# Patient Record
Sex: FEMALE | Race: WHITE | NOT HISPANIC OR LATINO | Employment: FULL TIME | URBAN - METROPOLITAN AREA
[De-identification: names, ages, dates, MRNs, and addresses within clinical notes are randomized per-mention and may not be internally consistent; named-entity substitution may affect disease eponyms.]

---

## 2017-03-06 ENCOUNTER — HOSPITAL ENCOUNTER (EMERGENCY)
Facility: CLINIC | Age: 27
Discharge: HOME | End: 2017-03-06
Attending: NURSE PRACTITIONER | Admitting: NURSE PRACTITIONER
Payer: COMMERCIAL

## 2017-03-06 VITALS
OXYGEN SATURATION: 98 % | DIASTOLIC BLOOD PRESSURE: 91 MMHG | HEART RATE: 113 BPM | TEMPERATURE: 98.2 F | WEIGHT: 210 LBS | BODY MASS INDEX: 38.41 KG/M2 | RESPIRATION RATE: 20 BRPM | SYSTOLIC BLOOD PRESSURE: 137 MMHG

## 2017-03-06 DIAGNOSIS — J06.9 VIRAL URI: ICD-10-CM

## 2017-03-06 LAB
INTERNAL QC OK POCT: YES
S PYO AG THROAT QL IA.RAPID: NEGATIVE

## 2017-03-06 PROCEDURE — 87880 STREP A ASSAY W/OPTIC: CPT | Performed by: NURSE PRACTITIONER

## 2017-03-06 PROCEDURE — 99213 OFFICE O/P EST LOW 20 MIN: CPT

## 2017-03-06 PROCEDURE — 99213 OFFICE O/P EST LOW 20 MIN: CPT | Performed by: NURSE PRACTITIONER

## 2017-03-06 PROCEDURE — 87081 CULTURE SCREEN ONLY: CPT | Performed by: NURSE PRACTITIONER

## 2017-03-06 ASSESSMENT — ENCOUNTER SYMPTOMS
LIGHT-HEADEDNESS: 1
RESPIRATORY NEGATIVE: 1
RHINORRHEA: 0
EYES NEGATIVE: 1
GASTROINTESTINAL NEGATIVE: 1
CARDIOVASCULAR NEGATIVE: 1
SORE THROAT: 1
FEVER: 1
MUSCULOSKELETAL NEGATIVE: 1
DIZZINESS: 0

## 2017-03-06 NOTE — ED AVS SNAPSHOT
[facility] Emergency Department    [address] [address]    Phone:  [phone]    Fax:  [phone]                                       Rosy España   MRN: [id number]    Department:  [facility] Emergency Department   Date of Visit:  3/6/2017           Patient Information     Date Of Birth          1990        Your diagnoses for this visit were:     Viral URI        You were seen by Gelacio Portillo, VICKY ROACH.      Follow-up Information     Follow up with None.    Why:  As needed        Discharge Instructions             Discharge References/Attachments     URI, VIRAL, NO ABX (ADULT) (ENGLISH)      24 Hour Appointment Hotline       To make an appointment at any [facility] clinic, call [phone] ([phone]). If you don't have a family doctor or clinic, we will help you find one. [facility] clinics are conveniently located to serve the needs of you and your family.             Review of your medicines      Our records show that you are taking the medicines listed below. If these are incorrect, please call your family doctor or clinic.        Dose / Directions Last dose taken    benzonatate 200 MG capsule   Commonly known as:  TESSALON   Dose:  200 mg   Quantity:  21 capsule        Take 1 capsule (200 mg) by mouth 3 times daily as needed for cough   Refills:  0        CITALOPRAM HYDROBROMIDE PO        Refills:  0        guaiFENesin-codeine 100-10 MG/5ML Soln solution   Commonly known as:  ROBITUSSIN AC   Dose:  1 tsp.   Quantity:  120 mL        Take 5 mLs by mouth every 4 hours as needed for cough   Refills:  0        prenatal multivitamin  plus iron 27-0.8 MG Tabs per tablet   Dose:  1 tablet        Take 1 tablet by mouth daily   Refills:  0        VITAMIN D (CHOLECALCIFEROL) PO   Dose:  5000 Units        Take 5,000 Units by mouth daily   Refills:  0                Procedures and tests performed during your visit     Rapid strep group A screen POCT      Orders  Needing Specimen Collection     None      Pending Results     No orders found from 3/4/2017 to 3/7/2017.            Pending Culture Results     No orders found from 3/4/2017 to 3/7/2017.             Test Results from your hospital stay     3/6/2017  6:39 PM - Carol Law LPN      Component Results     Component Value Ref Range & Units Status    Rapid Strep A Screen negative neg Final    Internal QC OK Yes  Final                Thank you for choosing [facility]       Thank you for choosing [facility] for your care. Our goal is always to provide you with excellent care. Hearing back from our patients is one way we can continue to improve our services. Please take a few minutes to complete the written survey that you may receive in the mail after you visit with us. Thank you!        [employer] Information     [employer] gives you secure access to your electronic health record. If you see a primary care provider, you can also send messages to your care team and make appointments. If you have questions, please call your primary care clinic.  If you do not have a primary care provider, please call [phone] and they will assist you.        Care EveryWhere ID     This is your Care EveryWhere ID. This could be used by other organizations to access your [facility] medical records  [id number]        After Visit Summary       This is your record. Keep this with you and show to your community pharmacist(s) and doctor(s) at your next visit.

## 2017-03-06 NOTE — ED AVS SNAPSHOT
[facility] Emergency Department    [address] [address]    Phone:  [phone]    Fax:  [phone]                                       Rosy España   MRN: [id number]    Department:  [facility] Emergency Department   Date of Visit:  3/6/2017           After Visit Summary Signature Page     I have received my discharge instructions, and my questions have been answered. I have discussed any challenges I see with this plan with the nurse or doctor.    ..........................................................................................................................................  Patient/Patient Representative Signature      ..........................................................................................................................................  Patient Representative Print Name and Relationship to Patient    ..................................................               ................................................  Date                                            Time    ..........................................................................................................................................  Reviewed by Signature/Title    ...................................................              ..............................................  Date                                                            Time

## 2017-03-07 ENCOUNTER — TELEPHONE (OUTPATIENT)
Dept: PEDIATRICS | Facility: CLINIC | Age: 27
End: 2017-03-07

## 2017-03-07 NOTE — TELEPHONE ENCOUNTER
Prior Authorization required on AZELEX  Insurance Phone [phone]    Patient ID  [id number]  Please contact the pharmacy with Prior Auth status (approved/denied)  Thank you, Addis Hussein, Technician  [facility]  [phone]

## 2017-03-07 NOTE — ED PROVIDER NOTES
History     Chief Complaint   Patient presents with     Otalgia     Fever     101.5 before ibuprofen     HPI  Rosy España is a 26 year old female who presents with fever, ST and a swollen gland on the left side. She has felt run down for about 3 weeks. She has been having pain on the left side near her ear but below. No significant swelling however. She did have a fever today as well. 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     I have reviewed the Medications, Allergies, Past Medical and Surgical History, and Social History in the Epic system.    Review of Systems   Constitutional: Positive for fever.   HENT: Positive for congestion, ear pain and sore throat. Negative for postnasal drip and rhinorrhea.    Eyes: Negative.    Respiratory: Negative.    Cardiovascular: Negative.    Gastrointestinal: Negative.    Genitourinary: Negative.    Musculoskeletal: Negative.    Skin: Negative.    Neurological: Positive for light-headedness. Negative for dizziness and syncope.       Physical Exam   BP: (!) 137/91  Pulse: 113  Temp: 98.2  F (36.8  C)  Resp: 20  Weight: 95.3 kg (210 lb)  SpO2: 98 %  Physical Exam   Constitutional: She is oriented to person, place, and time. She appears well-developed and well-nourished. No distress.   HENT:   Right Ear: External ear normal.   Left Ear: External ear normal.   Nose: Nose normal.   Mouth/Throat: No oropharyngeal exudate.   Eyes: Conjunctivae and EOM are normal. Right eye exhibits no discharge. Left eye exhibits no discharge. No scleral icterus.   Neck: Neck supple. No thyromegaly present.   Cardiovascular: Normal rate and normal heart sounds.    No murmur heard.  Pulmonary/Chest: Effort normal and breath sounds normal. No respiratory distress. She has no wheezes. She has no rales.   Musculoskeletal: Normal range of motion.   Lymphadenopathy:     She has cervical adenopathy.   Neurological: She is alert and oriented to person, place, and time. No cranial nerve  deficit. She exhibits normal muscle tone. Coordination normal.   Skin: Skin is warm and dry. No rash noted.       ED Course     ED Course     Procedures             Labs Ordered and Resulted from Time of ED Arrival Up to the Time of Departure from the ED   RAPID STREP GROUP A SCREEN POCT   rapid strep negative    Assessments & Plan (with Medical Decision Making)   Viral syndrome, possibly mono or CMV but declines testing at this time. Suggested that she should f/u with PCP if she continues to have vague symptoms.    I have reviewed the nursing notes.    I have reviewed the findings, diagnosis, plan and need for follow up with the patient.    Current Discharge Medication List          Final diagnoses:   Viral URI       3/6/2017   [facility] EMERGENCY DEPARTMENT     Gelacio Portillo, VICKY CNP  03/06/17 4263

## 2017-03-08 LAB
BACTERIA SPEC CULT: NORMAL
MICRO REPORT STATUS: NORMAL
SPECIMEN SOURCE: NORMAL

## 2017-03-08 NOTE — TELEPHONE ENCOUNTER
I do not think this is a patient of ours here. Has not seen a PCP at [facility] since 2014. Thank you!    Addis Tipton RN

## 2017-03-09 NOTE — TELEPHONE ENCOUNTER
Look like Azelex was ordered and then discontinued. Huddled with Willian Portillo. Reports that med was ordered in error on this patient. Pharmacy advised.     [facility] RN

## 2017-03-14 ENCOUNTER — HOSPITAL ENCOUNTER (EMERGENCY)
Facility: CLINIC | Age: 27
Discharge: HOME | End: 2017-03-14
Attending: PHYSICIAN ASSISTANT | Admitting: PHYSICIAN ASSISTANT
Payer: COMMERCIAL

## 2017-03-14 VITALS
DIASTOLIC BLOOD PRESSURE: 66 MMHG | RESPIRATION RATE: 18 BRPM | OXYGEN SATURATION: 98 % | TEMPERATURE: 99.4 F | SYSTOLIC BLOOD PRESSURE: 122 MMHG

## 2017-03-14 DIAGNOSIS — R11.2 NAUSEA VOMITING AND DIARRHEA: ICD-10-CM

## 2017-03-14 DIAGNOSIS — R19.7 NAUSEA VOMITING AND DIARRHEA: ICD-10-CM

## 2017-03-14 PROCEDURE — 99283 EMERGENCY DEPT VISIT LOW MDM: CPT | Performed by: PHYSICIAN ASSISTANT

## 2017-03-14 PROCEDURE — 99282 EMERGENCY DEPT VISIT SF MDM: CPT

## 2017-03-14 RX ORDER — AMOXICILLIN 500 MG/1
500 CAPSULE ORAL 2 TIMES DAILY
COMMUNITY
Start: 2017-03-08 | End: 2017-03-18

## 2017-03-14 RX ORDER — ONDANSETRON 4 MG/1
4 TABLET, ORALLY DISINTEGRATING ORAL EVERY 8 HOURS PRN
Qty: 15 TABLET | Refills: 0 | Status: SHIPPED | OUTPATIENT
Start: 2017-03-14 | End: 2017-03-17

## 2017-03-14 NOTE — ED AVS SNAPSHOT
[facility] Emergency Department    [address] [address]    Phone:  [phone]    Fax:  [phone]                                       Rosy España   MRN: [id number]    Department:  [facility] Emergency Department   Date of Visit:  3/14/2017           Patient Information     Date Of Birth          1990        Your diagnoses for this visit were:     Nausea vomiting and diarrhea        You were seen by Anibal Azar PA-C.      Follow-up Information     Follow up with No Ref-Primary, Physician In 2 days.    Why:  As needed        Discharge Instructions       Consider loperamide over the counter - see instructions.  Hydrate every hour if possible about an hour after taking the zofran.  Miguel Azar PA-C    24 Hour Appointment Hotline       To make an appointment at any [facility] clinic, call [phone]). If you don't have a family doctor or clinic, we will help you find one. [facility] clinics are conveniently located to serve the needs of you and your family.             Review of your medicines      START taking        Dose / Directions Last dose taken    ondansetron 4 MG ODT tab   Commonly known as:  ZOFRAN ODT   Dose:  4 mg   Quantity:  15 tablet        Take 1 tablet (4 mg) by mouth every 8 hours as needed for nausea   Refills:  0          Our records show that you are taking the medicines listed below. If these are incorrect, please call your family doctor or clinic.        Dose / Directions Last dose taken    benzonatate 200 MG capsule   Commonly known as:  TESSALON   Dose:  200 mg   Quantity:  21 capsule        Take 1 capsule (200 mg) by mouth 3 times daily as needed for cough   Refills:  0        CITALOPRAM HYDROBROMIDE PO        Refills:  0        guaiFENesin-codeine 100-10 MG/5ML Soln solution   Commonly known as:  ROBITUSSIN AC   Dose:  1 tsp.   Quantity:  120 mL        Take 5 mLs by mouth every 4 hours as needed for cough   Refills:  0         prenatal multivitamin  plus iron 27-0.8 MG Tabs per tablet   Dose:  1 tablet        Take 1 tablet by mouth daily   Refills:  0        VITAMIN D (CHOLECALCIFEROL) PO   Dose:  5000 Units        Take 5,000 Units by mouth daily   Refills:  0                Prescriptions were sent or printed at these locations (1 Prescription)                   Other Prescriptions                Printed at Department/Unit printer (1 of 1)         ondansetron (ZOFRAN ODT) 4 MG ODT tab                Orders Needing Specimen Collection     None      Pending Results     No orders found from 3/12/2017 to 3/15/2017.            Pending Culture Results     No orders found from 3/12/2017 to 3/15/2017.             Test Results from your hospital stay            Thank you for choosing [facility]       Thank you for choosing [facility] for your care. Our goal is always to provide you with excellent care. Hearing back from our patients is one way we can continue to improve our services. Please take a few minutes to complete the written survey that you may receive in the mail after you visit with us. Thank you!        [employer] Information     [employer] gives you secure access to your electronic health record. If you see a primary care provider, you can also send messages to your care team and make appointments. If you have questions, please call your primary care clinic.  If you do not have a primary care provider, please call [phone] and they will assist you.        Care EveryWhere ID     This is your Care EveryWhere ID. This could be used by other organizations to access your [facility] medical records  [id number]        After Visit Summary       This is your record. Keep this with you and show to your community pharmacist(s) and doctor(s) at your next visit.

## 2017-03-14 NOTE — ED AVS SNAPSHOT
[facility] Emergency Department    [address] [address]    Phone:  [phone]    Fax:  [phone]                                       Rosy España   MRN: [id number]    Department:  [facility] Emergency Department   Date of Visit:  3/14/2017           After Visit Summary Signature Page     I have received my discharge instructions, and my questions have been answered. I have discussed any challenges I see with this plan with the nurse or doctor.    ..........................................................................................................................................  Patient/Patient Representative Signature      ..........................................................................................................................................  Patient Representative Print Name and Relationship to Patient    ..................................................               ................................................  Date                                            Time    ..........................................................................................................................................  Reviewed by Signature/Title    ...................................................              ..............................................  Date                                                            Time

## 2017-03-14 NOTE — DISCHARGE INSTRUCTIONS
Consider loperamide over the counter - see instructions.  Hydrate every hour if possible about an hour after taking the zofran.  Miguel Azar PA-C

## 2017-03-14 NOTE — ED PROVIDER NOTES
History     Chief Complaint   Patient presents with     Nausea, Vomiting, & Diarrhea     Pt woke up with N, V, D at 0130.  Fever started during th night too.  Headache for the past 2 hours.      Fever     Headache     HPI  Rosy España is a 26 year old female who has N V D.  Onset one day.  Nothing bloody, no travel, or obvious exposure.  Possible enchilada last night was the source.  Abd cramping is present.     I have reviewed the Medications, Allergies, Past Medical and Surgical History, and Social History in the Epic system.    Review of Systems  ROS:  General:  No night sweats reported  ENT: No epistaxis  Skin: No petechiae  Psychiatric: Not combative  : No hematuria reported    Physical Exam   BP: 122/66  Heart Rate: 122  Temp: 99.4  F (37.4  C)  Resp: 20  SpO2: 98 %  Physical Exam  Gen: 26 year old female appears stated age  Eyes: Equal and round  Head: NC, AT, GCS 15  ENT: Canal and nares patent  Extremity: MAEW  Skin: Warm and dry  Psych: Mood and affect normal  Neuro: CN II-XII grossly in tact  Abd: soft NTND BS+  ED Course     ED Course     Procedures        Long discussion / education on NVD and hydration       Labs Ordered and Resulted from Time of ED Arrival Up to the Time of Departure from the ED - No data to display    Assessments & Plan (with Medical Decision Making)     I have reviewed the nursing notes.    I have reviewed the findings, diagnosis, plan and need for follow up with the patient.    New Prescriptions    ONDANSETRON (ZOFRAN ODT) 4 MG ODT TAB    Take 1 tablet (4 mg) by mouth every 8 hours as needed for nausea       Final diagnoses:   Nausea vomiting and diarrhea       3/14/2017   [facility] EMERGENCY DEPARTMENT     Anibal Azar PA-C  03/14/17 6580

## 2017-11-15 ENCOUNTER — HOSPITAL ENCOUNTER (EMERGENCY)
Facility: CLINIC | Age: 27
Discharge: HOME | End: 2017-11-15
Attending: FAMILY MEDICINE | Admitting: FAMILY MEDICINE
Payer: COMMERCIAL

## 2017-11-15 VITALS
RESPIRATION RATE: 16 BRPM | TEMPERATURE: 98.7 F | SYSTOLIC BLOOD PRESSURE: 137 MMHG | OXYGEN SATURATION: 100 % | DIASTOLIC BLOOD PRESSURE: 78 MMHG

## 2017-11-15 DIAGNOSIS — J02.0 STREPTOCOCCAL PHARYNGITIS: ICD-10-CM

## 2017-11-15 DIAGNOSIS — J06.9 URI WITH COUGH AND CONGESTION: ICD-10-CM

## 2017-11-15 LAB
INTERNAL QC OK POCT: YES
S PYO AG THROAT QL IA.RAPID: ABNORMAL

## 2017-11-15 PROCEDURE — 99213 OFFICE O/P EST LOW 20 MIN: CPT | Performed by: FAMILY MEDICINE

## 2017-11-15 PROCEDURE — 87880 STREP A ASSAY W/OPTIC: CPT | Performed by: FAMILY MEDICINE

## 2017-11-15 PROCEDURE — 99213 OFFICE O/P EST LOW 20 MIN: CPT

## 2017-11-15 RX ORDER — PENICILLIN V POTASSIUM 500 MG/1
500 TABLET, FILM COATED ORAL 2 TIMES DAILY
Qty: 20 TABLET | Refills: 0 | Status: SHIPPED | OUTPATIENT
Start: 2017-11-15 | End: 2017-11-25

## 2017-11-15 RX ORDER — FLUTICASONE PROPIONATE 50 MCG
1-2 SPRAY, SUSPENSION (ML) NASAL DAILY
Qty: 1 BOTTLE | Refills: 0 | Status: SHIPPED | OUTPATIENT
Start: 2017-11-15 | End: 2021-12-13

## 2017-11-15 NOTE — ED AVS SNAPSHOT
[facility] Emergency Department    [address] [address]    Phone:  [phone]    Fax:  [phone]                                       Rosy España   MRN: [id number]    Department:  [facility] Emergency Department   Date of Visit:  11/15/2017           After Visit Summary Signature Page     I have received my discharge instructions, and my questions have been answered. I have discussed any challenges I see with this plan with the nurse or doctor.    ..........................................................................................................................................  Patient/Patient Representative Signature      ..........................................................................................................................................  Patient Representative Print Name and Relationship to Patient    ..................................................               ................................................  Date                                            Time    ..........................................................................................................................................  Reviewed by Signature/Title    ...................................................              ..............................................  Date                                                            Time

## 2017-11-15 NOTE — DISCHARGE INSTRUCTIONS
(J02.0) Streptococcal pharyngitis  (J06.9) URI with cough and congestion    -- your rapid strep test was positive  -- recommend starting penicillin 500 mg by mouth twice daily x 10 days  -- in the meantime recommend symptomatic measures including increased fluids, rest, appropriate use of tylenol and ibuprofen, throat drops/lozenges, increased fluids, and licorice/mint teas.    -- follow-up if not at least notably improved in about 1 week, sooner if notably worsening or if having increased trouble with swallowing  -- please avoid contact with others as much as possible until you've been on antibiotics for at least 24 hrs and not having any fevers.  --  the Flonase and use for congestion.    Follow up as needed.

## 2017-11-15 NOTE — ED AVS SNAPSHOT
[facility] Emergency Department    [address] [address]    Phone:  [phone]    Fax:  [phone]                                       Rosy España   MRN: [id number]    Department:  [facility] Emergency Department   Date of Visit:  11/15/2017           Patient Information     Date Of Birth          1990        Your diagnoses for this visit were:     URI with cough and congestion     Streptococcal pharyngitis        You were seen by Tisha David MD.      Follow-up Information     Follow up with No Ref-Primary, Physician.    Why:  As needed    Contact information:    NO REF-PRIMARY PHYSICIAN          Discharge Instructions       (J02.0) Streptococcal pharyngitis  (J06.9) URI with cough and congestion    -- your rapid strep test was positive  -- recommend starting penicillin 500 mg by mouth twice daily x 10 days  -- in the meantime recommend symptomatic measures including increased fluids, rest, appropriate use of tylenol and ibuprofen, throat drops/lozenges, increased fluids, and licorice/mint teas.    -- follow-up if not at least notably improved in about 1 week, sooner if notably worsening or if having increased trouble with swallowing  -- please avoid contact with others as much as possible until you've been on antibiotics for at least 24 hrs and not having any fevers.  --  the Flonase and use for congestion.    Follow up as needed.          24 Hour Appointment Hotline       To make an appointment at any [facility] clinic, call [phone]). If you don't have a family doctor or clinic, we will help you find one. [facility] clinics are conveniently located to serve the needs of you and your family.             Review of your medicines      START taking        Dose / Directions Last dose taken    fluticasone 50 MCG/ACT spray   Commonly known as:  FLONASE   Dose:  1-2 spray   Quantity:  1 Bottle        Spray 1-2 sprays into both nostrils  daily   Refills:  0        penicillin V potassium 500 MG tablet   Commonly known as:  VEETID   Dose:  500 mg   Quantity:  20 tablet        Take 1 tablet (500 mg) by mouth 2 times daily for 10 days   Refills:  0          Our records show that you are taking the medicines listed below. If these are incorrect, please call your family doctor or clinic.        Dose / Directions Last dose taken    CITALOPRAM HYDROBROMIDE PO   Dose:  40 mg        Take 40 mg by mouth daily   Refills:  0                Prescriptions were sent or printed at these locations (2 Prescriptions)                   [facility] [place], [place] - [address]   [address] [address]    Telephone:  [phone]   Fax:  [phone]   Hours:                  E-Prescribed (2 of 2)         penicillin V potassium (VEETID) 500 MG tablet               fluticasone (FLONASE) 50 MCG/ACT spray                Orders Needing Specimen Collection     None      Pending Results     No orders found from 11/13/2017 to 11/16/2017.            Pending Culture Results     No orders found from 11/13/2017 to 11/16/2017.            Pending Results Instructions     If you had any lab results that were not finalized at the time of your Discharge, you can call the ED Lab Result RN at [phone]. You will be contacted by this team for any positive Lab results or changes in treatment. The nurses are available 7 days a week from 10A to 6:30P.  You can leave a message 24 hours per day and they will return your call.        Test Results From Your Hospital Stay               Thank you for choosing [facility]       Thank you for choosing [facility] for your care. Our goal is always to provide you with excellent care. Hearing back from our patients is one way we can continue to improve our services. Please take a few minutes to complete the written survey that you may receive in the mail after you visit with us. Thank you!        MyChart Information      [employer] gives you secure access to your electronic health record. If you see a primary care provider, you can also send messages to your care team and make appointments. If you have questions, please call your primary care clinic.  If you do not have a primary care provider, please call [phone] and they will assist you.        Care EveryWhere ID     This is your Care EveryWhere ID. This could be used by other organizations to access your [facility] medical records  [id number]        Equal Access to Services     HAWA BREAUX : Hadii yashira guamano Soyoel, waaxda luqadaha, qaybta kaalmada adecelia, shanna mcgee . So [facility] [phone].    ATENCIÓN: Si habla español, tiene a sun disposición servicios gratuitos de asistencia lingüística. Milena al [phone].    We comply with applicable federal civil rights laws and [place] laws. We do not discriminate on the basis of race, color, national origin, age, disability, sex, sexual orientation, or gender identity.            After Visit Summary       This is your record. Keep this with you and show to your community pharmacist(s) and doctor(s) at your next visit.

## 2017-11-15 NOTE — ED PROVIDER NOTES
History     Chief Complaint   Patient presents with     Pharyngitis     Sore throat off and on, achy all over, headache, nausea, feels similar to strep in past.     HPI  Rosy España is a 27 year old female who presents with  Concerns about strep.  5 days ago- left ear pain, this has however since resolved. She has been achy and tired and has an associated sore throat which she describes as mild but is concerned about all the symptoms coming together. No congestion, no post nasal drainage that she has been able to feel, no throat clearing. No associated fever but she has had some sweats especially at night.  No known ill contacts. No history of seasonal allergies.  She has been taking tylenol/ibuprofen for pain but this has not been of help.        Problem List:    Patient Active Problem List    Diagnosis Date Noted     Obsessive thinking 03/30/2013     Priority: Medium     CARDIOVASCULAR SCREENING; LDL GOAL LESS THAN 160 08/16/2011     Priority: Medium     Anxiety 01/06/2011     Priority: Medium        Past Medical History:    No past medical history on file.    Past Surgical History:    Past Surgical History:   Procedure Laterality Date     ARTHROSCOPY KNEE RT/LT  7/07    partial medial meniscectomy, RT       Family History:    Family History   Problem Relation Age of Onset     HEART DISEASE Father      MI     C.A.D. Father      DIABETES Maternal Grandfather      Breast Cancer No family hx of      Cancer - colorectal No family hx of        Social History:  Marital Status:   [2]  Social History   Substance Use Topics     Smoking status: Never Smoker     Smokeless tobacco: Never Used     Alcohol use Yes      Comment: once per month        Medications:      CITALOPRAM HYDROBROMIDE PO         Review of Systems   Pertinent aspects as in the history.       Physical Exam   BP: 137/78  Heart Rate: 93  Temp: 98.7  F (37.1  C)  Resp: 16  SpO2: 100 %    Physical Exam   General - Awake and alert, not in any obvious  distress. Afebrile, not pale well hydrated.  Head - Normocephalic, atraumatic.  Ears - Tympanic membranes clear bilaterally. External canals without lesion.  Mouth - Mild posterior pharyngeal erythema, tonsils not enlarged or exudative.   Neck - no cervical adenopathy, thyromegally, JVD or carotid bruits noted.  Psych - Judgment and mental status are clear, patient has reasonable insight. Mood is stable.        ED Course     ED Course     Procedures    Strep test positive      Labs Ordered and Resulted from Time of ED Arrival Up to the Time of Departure from the ED - No data to display    Assessments & Plan (with Medical Decision Making)     I have reviewed the nursing notes.    I have reviewed the findings, diagnosis, plan and need for follow up with the patient.    New Prescriptions    FLUTICASONE (FLONASE) 50 MCG/ACT SPRAY    Spray 1-2 sprays into both nostrils daily    PENICILLIN V POTASSIUM (VEETID) 500 MG TABLET    Take 1 tablet (500 mg) by mouth 2 times daily for 10 days       Final diagnoses:   URI with cough and congestion   Streptococcal pharyngitis       Diagnosis, differential diagnosis, treatment and prognosis discussed with patient    See below for summary of discussion(s) with patient:    -- your rapid strep test was positive  -- recommend starting penicillin 500 mg by mouth twice daily x 10 days  -- in the meantime recommend symptomatic measures including increased fluids, rest, appropriate use of tylenol and ibuprofen, throat drops/lozenges, increased fluids, and licorice/mint teas.    -- follow-up if not at least notably improved in about 1 week, sooner if notably worsening or if having increased trouble with swallowing  -- please avoid contact with others as much as possible until you've been on antibiotics for at least 24 hrs and not having any fevers.  --  the Flonase and use for congestion.    Follow up as needed.  11/15/2017   [facility] EMERGENCY DEPARTMENT     Tisha David  MD [place]  11/15/17 [id number]

## 2018-02-04 ENCOUNTER — HEALTH MAINTENANCE LETTER (OUTPATIENT)
Age: 28
End: 2018-02-04

## 2018-12-31 ENCOUNTER — HOSPITAL ENCOUNTER (EMERGENCY)
Facility: CLINIC | Age: 28
Discharge: HOME | End: 2018-12-31
Attending: PHYSICIAN ASSISTANT | Admitting: PHYSICIAN ASSISTANT
Payer: COMMERCIAL

## 2018-12-31 VITALS — OXYGEN SATURATION: 100 % | RESPIRATION RATE: 18 BRPM | TEMPERATURE: 99.3 F

## 2018-12-31 DIAGNOSIS — J02.9 ACUTE PHARYNGITIS, UNSPECIFIED ETIOLOGY: ICD-10-CM

## 2018-12-31 LAB
INTERNAL QC OK POCT: YES
S PYO AG THROAT QL IA.RAPID: NEGATIVE

## 2018-12-31 PROCEDURE — G0463 HOSPITAL OUTPT CLINIC VISIT: HCPCS

## 2018-12-31 PROCEDURE — 99213 OFFICE O/P EST LOW 20 MIN: CPT | Performed by: PHYSICIAN ASSISTANT

## 2018-12-31 PROCEDURE — 87081 CULTURE SCREEN ONLY: CPT | Performed by: PHYSICIAN ASSISTANT

## 2018-12-31 PROCEDURE — 87880 STREP A ASSAY W/OPTIC: CPT | Performed by: PHYSICIAN ASSISTANT

## 2018-12-31 RX ORDER — DEXAMETHASONE 4 MG/1
10 TABLET ORAL ONCE
Qty: 3 TABLET | Refills: 0 | Status: SHIPPED | OUTPATIENT
Start: 2018-12-31 | End: 2018-12-31

## 2018-12-31 NOTE — ED AVS SNAPSHOT
[facility] Emergency Department  [address] [address]  Phone:  [phone]  Fax:  [phone]                                    Rosy España   MRN: [id number]    Department:  [facility] Emergency Department   Date of Visit:  12/31/2018           After Visit Summary Signature Page    I have received my discharge instructions, and my questions have been answered. I have discussed any challenges I see with this plan with the nurse or doctor.    ..........................................................................................................................................  Patient/Patient Representative Signature      ..........................................................................................................................................  Patient Representative Print Name and Relationship to Patient    ..................................................               ................................................  Date                                   Time    ..........................................................................................................................................  Reviewed by Signature/Title    ...................................................              ..............................................  Date                                               Time          22EPIC Rev 08/18

## 2018-12-31 NOTE — ED PROVIDER NOTES
History     Chief Complaint   Patient presents with     Pharyngitis     HPI  Rosy España is a 28 year old female who presents to the urgent care with concern over 4-day history of sore throat.  She additionally complains of chills, myalgias, headache, nausea, low-grade fever up to 100.6 however she has been on Tylenol and ibuprofen consistently.  Last dose of antipyretic was ibuprofen less than 4 hours prior to arrival.  She denies any nasal congestion, cough, dyspnea, wheezing, vomiting, diarrhea or abdominal apin.  She denies any close contacts with strep throat.      Problem List:    Patient Active Problem List    Diagnosis Date Noted     Obsessive thinking 03/30/2013     Priority: Medium     CARDIOVASCULAR SCREENING; LDL GOAL LESS THAN 160 08/16/2011     Priority: Medium     Anxiety 01/06/2011     Priority: Medium      Past Medical History:    History reviewed. No pertinent past medical history.    Past Surgical History:    Past Surgical History:   Procedure Laterality Date     ARTHROSCOPY KNEE RT/LT  7/07    partial medial meniscectomy, RT     Family History:    Family History   Problem Relation Age of Onset     Heart Disease Father         MI     C.A.D. Father      Diabetes Maternal Grandfather      Breast Cancer No family hx of      Cancer - colorectal No family hx of      Social History:  Marital Status:   [2]  Social History     Tobacco Use     Smoking status: Never Smoker     Smokeless tobacco: Never Used   Substance Use Topics     Alcohol use: Yes     Comment: once per month     Drug use: No      Medications:      dexamethasone (DECADRON) 4 MG tablet   magic mouthwash (ENTER INGREDIENTS IN COMMENTS) suspension   CITALOPRAM HYDROBROMIDE PO   fluticasone (FLONASE) 50 MCG/ACT spray     Review of Systems  CONSTITUTIONAL:POSITIVE  for fever, chills, myalgias   INTEGUMENTARY/SKIN: NEGATIVE for worrisome rashes, moles or lesions  EYES: NEGATIVE for vision changes or irritation  ENT/MOUTH: POSITIVE  for sore throat and NEGATIVE for nasal congestion, ear pain   RESP:NEGATIVE for cough, SOB/dyspnea, wheezing   GI: POSITIVE for nausea and NEGATIVE for vomiting, diarrhea, abdominal pain   Physical Exam   Heart Rate: 85  Temp: 99.3  F (37.4  C)  Resp: 18  SpO2: 100 %  Physical Exam  GENERAL APPEARANCE: healthy, alert and no distress  EYES: EOMI,  PERRL, conjunctiva clear  HENT: ear canals and TM's normal.  Posterior pharynx is erythematous, mildly edematous without tonsillar exudate  NECK: supple, nontender, bilateral tonsillar lymphadenopathy  RESP: lungs clear to auscultation - no rales, rhonchi or wheezes  CV: regular rates and rhythm, normal S1 S2, no murmur noted  SKIN: no suspicious lesions or rashes  ED Course        Procedures               Critical Care time:  none               Results for orders placed or performed during the hospital encounter of 12/31/18 (from the past 24 hour(s))   Rapid strep group A screen POCT   Result Value Ref Range    Rapid Strep A Screen negative neg    Internal QC OK Yes      Medications - No data to display    Assessments & Plan (with Medical Decision Making)     I have reviewed the nursing notes.    I have reviewed the findings, diagnosis, plan and need for follow up with the patient.          Medication List      Started    dexamethasone 4 MG tablet  Commonly known as:  DECADRON  10 mg, Oral, ONCE     magic mouthwash suspension  Commonly known as:  ENTER INGREDIENTS IN COMMENTS  10 mLs, Oral, EVERY 4 HOURS PRN          Final diagnoses:   Acute pharyngitis, unspecified etiology     RST negative with culture pending.  No evidence of peritonsillar cellulitis or abscess.   I do not suspect mono at this time.   She was instructed to continue OTC symptomatic treatment.  I did discuss her/benefits of symptomatic treatment with Decadron, Magic mouthwash and patient elected to initiate both.  She was instructed to follow up with PCP if no improvement of fever in 48-72 hours.   Worrisome reasons to seek care sooner discussed.      Disclaimer: This note consists of symbols derived from keyboarding, dictation, and/or voice recognition software. As a result, there may be errors in the script that have gone undetected.  Please consider this when interpreting information found in the chart.    12/31/2018   [facility] EMERGENCY DEPARTMENT     Krupa Gonzalez PA-C  01/02/19 111

## 2019-01-02 LAB
BACTERIA SPEC CULT: NORMAL
Lab: NORMAL
SPECIMEN SOURCE: NORMAL

## 2019-01-02 NOTE — RESULT ENCOUNTER NOTE
Final Beta strep group A r/o culture is NEGATIVE for Group A streptococcus.    No treatment or change in treatment per [place] Strep protocol.

## 2019-11-08 ENCOUNTER — HEALTH MAINTENANCE LETTER (OUTPATIENT)
Age: 29
End: 2019-11-08

## 2020-01-07 ENCOUNTER — HOSPITAL ENCOUNTER (EMERGENCY)
Facility: CLINIC | Age: 30
Discharge: HOME | End: 2020-01-07
Attending: PHYSICIAN ASSISTANT | Admitting: PHYSICIAN ASSISTANT
Payer: COMMERCIAL

## 2020-01-07 ENCOUNTER — APPOINTMENT (OUTPATIENT)
Dept: GENERAL RADIOLOGY | Facility: CLINIC | Age: 30
End: 2020-01-07
Attending: PHYSICIAN ASSISTANT
Payer: COMMERCIAL

## 2020-01-07 VITALS
HEIGHT: 61 IN | DIASTOLIC BLOOD PRESSURE: 76 MMHG | OXYGEN SATURATION: 98 % | BODY MASS INDEX: 35.87 KG/M2 | RESPIRATION RATE: 16 BRPM | WEIGHT: 190 LBS | SYSTOLIC BLOOD PRESSURE: 121 MMHG | TEMPERATURE: 98.8 F

## 2020-01-07 DIAGNOSIS — R05.9 COUGH: ICD-10-CM

## 2020-01-07 LAB
INTERNAL QC OK POCT: YES
S PYO AG THROAT QL IA.RAPID: NEGATIVE

## 2020-01-07 PROCEDURE — 87880 STREP A ASSAY W/OPTIC: CPT | Performed by: PHYSICIAN ASSISTANT

## 2020-01-07 PROCEDURE — 87081 CULTURE SCREEN ONLY: CPT | Performed by: PHYSICIAN ASSISTANT

## 2020-01-07 PROCEDURE — G0463 HOSPITAL OUTPT CLINIC VISIT: HCPCS | Mod: 25 | Performed by: PHYSICIAN ASSISTANT

## 2020-01-07 PROCEDURE — 99214 OFFICE O/P EST MOD 30 MIN: CPT | Mod: Z6 | Performed by: PHYSICIAN ASSISTANT

## 2020-01-07 PROCEDURE — 71046 X-RAY EXAM CHEST 2 VIEWS: CPT

## 2020-01-07 RX ORDER — BENZONATATE 200 MG/1
100-200 CAPSULE ORAL 3 TIMES DAILY PRN
Qty: 42 CAPSULE | Refills: 0 | Status: SHIPPED | OUTPATIENT
Start: 2020-01-07 | End: 2020-02-06

## 2020-01-07 RX ORDER — AZITHROMYCIN 250 MG/1
TABLET, FILM COATED ORAL
Qty: 6 TABLET | Refills: 0 | Status: SHIPPED | OUTPATIENT
Start: 2020-01-07 | End: 2020-01-12

## 2020-01-07 RX ORDER — DEXAMETHASONE 4 MG/1
10 TABLET ORAL ONCE
Qty: 3 TABLET | Refills: 0 | Status: SHIPPED | OUTPATIENT
Start: 2020-01-07 | End: 2021-12-13

## 2020-01-07 ASSESSMENT — MIFFLIN-ST. JEOR: SCORE: 1519.21

## 2020-01-07 NOTE — ED AVS SNAPSHOT
[facility] Emergency Department  [address] [place] [address]  Phone:  [phone]  Fax:  [phone]                                    Rosy España   MRN: [id number]    Department:  [facility] Emergency Department   Date of Visit:  1/7/2020           After Visit Summary Signature Page    I have received my discharge instructions, and my questions have been answered. I have discussed any challenges I see with this plan with the nurse or doctor.    ..........................................................................................................................................  Patient/Patient Representative Signature      ..........................................................................................................................................  Patient Representative Print Name and Relationship to Patient    ..................................................               ................................................  Date                                   Time    ..........................................................................................................................................  Reviewed by Signature/Title    ...................................................              ..............................................  Date                                               Time          22EPIC Rev 08/18

## 2020-01-08 NOTE — ED PROVIDER NOTES
History     Chief Complaint   Patient presents with     Influenza     Headache/ staes she has Flu - 2 weeks     HPI  Rosy España is a 29 year old female who presents to the urgent care with concern over illness which is present for approximate last 3 weeks.  Patient reports that she was initially thought to have influenza when she developed objective fever up to 102, chills, myalgias, headache.  She was evaluated at an outside office visit and had negative strep testing at that time.  She reports that her symptoms did initially improve however she continued to have clear rhinorrhea, dry cough.  In the last several days she has developed increasing cough, right ear pain, right-sided throat pain, swollen glands and had a temp up to 101 earlier this afternoon.  She did attempt to treat with ibuprofen, last dose was 90 minutes prior to arrival.      Allergies:  Allergies   Allergen Reactions     Percocet [Oxycodone-Acetaminophen] Nausea and Vomiting and Visual Disturbance     Dizziness and headaches     Vicodin [Hydrocodone-Acetaminophen] Nausea and Vomiting and Visual Disturbance     Dizziness and headaches     Problem List:    Patient Active Problem List    Diagnosis Date Noted     Obsessive thinking 03/30/2013     Priority: Medium     CARDIOVASCULAR SCREENING; LDL GOAL LESS THAN 160 08/16/2011     Priority: Medium     Anxiety 01/06/2011     Priority: Medium        Past Medical History:    No past medical history on file.    Past Surgical History:    Past Surgical History:   Procedure Laterality Date     ARTHROSCOPY KNEE RT/LT  7/07    partial medial meniscectomy, RT     Family History:    Family History   Problem Relation Age of Onset     Heart Disease Father         MI     C.A.D. Father      Diabetes Maternal Grandfather      Breast Cancer No family hx of      Cancer - colorectal No family hx of      Social History:  Marital Status:   [2]  Social History     Tobacco Use     Smoking status: Never Smoker  "    Smokeless tobacco: Never Used   Substance Use Topics     Alcohol use: Yes     Comment: once per month     Drug use: No        Medications:    CITALOPRAM HYDROBROMIDE PO  dexamethasone (DECADRON) 4 MG tablet  fluticasone (FLONASE) 50 MCG/ACT spray        Review of Systems  CONSTITUTIONAL:POSITIVE  for fever, chills, myalgias   INTEGUMENTARY/SKIN: NEGATIVE for worrisome rashes, moles or lesions  EYES: NEGATIVE for vision changes or irritation  ENT/MOUTH: POSITIVE for sore throat, right era pain and rhinorrhea/congestion  RESP:POSITIVE for cough and NEGATIVE for shortness of breath, wheezing  GI: NEGATIVE for vomiting, diarrhea, abdominal pain   Physical Exam   BP: 121/76  Heart Rate: 106  Temp: 98.8  F (37.1  C)  Resp: 16  Height: 154.9 cm (5' 1\")  Weight: 86.2 kg (190 lb)  SpO2: 98 %  Physical Exam  GENERAL APPEARANCE:alert, non-toxic ill appearing  EYES: EOMI,  PERRL, conjunctiva clear  HENT: ear canals and TM's normal.  Nose and mouth without ulcers, erythema or lesions  NECK: supple, nontender, tonsillar lymphadenopathy, right greater than left  RESP: lungs clear to auscultation - no rales, rhonchi or wheezes  CV: tachycardia,  regular rhythm, normal S1 S2, no murmur noted  NEURO: Normal strength and tone, sensory exam grossly normal,  normal speech and mentation  SKIN: no suspicious lesions or rashes  ED Course        Procedures        Critical Care time:  none        Results for orders placed or performed during the hospital encounter of 01/07/20 (from the past 24 hour(s))   Rapid strep group A screen POCT   Result Value Ref Range    Rapid Strep A Screen negative neg    Internal QC OK Yes        Results for orders placed or performed during the hospital encounter of 01/07/20   Chest XR,  PA & LAT    Narrative    CHEST TWO VIEWS   1/7/2020 7:38 PM     HISTORY: Cough, fever, rule out pneumonia.    COMPARISON: Chest x-ray on 11/15/2016.      Impression    IMPRESSION: No acute airspace disease.    AHMAD " MD DARREN     Medications - No data to display    Assessments & Plan (with Medical Decision Making)     I have reviewed the nursing notes.  I have reviewed the findings, diagnosis, plan and need for follow up with the patient.       Discharge Medication List as of 1/7/2020  8:16 PM      START taking these medications    Details   azithromycin (ZITHROMAX Z-ZANE) 250 MG tablet Two tablets on the first day, then one tablet daily for the next 4 days, Disp-6 tablet, R-0, E-Prescribe      benzonatate (TESSALON) 200 MG capsule Take 0.5-1 capsules (100-200 mg) by mouth 3 times daily as needed for cough, Disp-42 capsule, R-0, E-Prescribe           29-year-old female presents to the urgent care with concern over 2-week history of cough with new onset fever ear pain, sore throat within the last several days.  She was noted to be tachycardic upon arrival, remainder of vital signs within normal limits.  Physical exam findings as described above did not demonstrate any evidence of otitis media, otitis externa.  As part of evaluation patient did have negative rapid strep test and chest x-ray which was read as normal per radiology report however I do have some concern for  possible developingright lower lobe infiltrate and history is consistent with pneumonia.  I discussed with patient that if there is no bacterial infection antibiotics will not have any effect on her symptom.  Differential would include influenza, bronchitis, sinusitis.  I do not suspect pertussis, PE.  She was discharged home stable with prescription for Zithromax as well as tessalon and decadron for symptomatic relief.  Follow-up with primary care provider if no improvement of fever within the next three days.  Worrisome reasons to return to ER/UC sooner discussed.     Final diagnoses:   Cough     Disclaimer: This note consists of symbols derived from keyboarding, dictation, and/or voice recognition software. As a result, there may be errors in the script  that have gone undetected.  Please consider this when interpreting information found in the chart.      1/7/2020   [facility] EMERGENCY DEPARTMENT     Krupa Gonzalez PA-C  01/09/20 1345

## 2020-01-10 LAB
BACTERIA SPEC CULT: NORMAL
Lab: NORMAL
SPECIMEN SOURCE: NORMAL

## 2020-01-10 NOTE — RESULT ENCOUNTER NOTE
Final Beta strep group A r/o culture is NEGATIVE for Group A streptococcus.    No treatment or change in treatment per [place] Strep protocol.

## 2020-02-23 ENCOUNTER — HEALTH MAINTENANCE LETTER (OUTPATIENT)
Age: 30
End: 2020-02-23

## 2020-11-04 ENCOUNTER — VIRTUAL VISIT (OUTPATIENT)
Dept: FAMILY MEDICINE | Facility: OTHER | Age: 30
End: 2020-11-04

## 2020-11-04 NOTE — PROGRESS NOTES
"Date: 2020 09:52:46  Clinician: Candida Yen  Clinician NPI: [id number]  Patient: Rosy España  Patient : 1990  Patient Address[place] [address] [place] [address]  Patient Phone: [phone]  Visit Protocol: URI  Patient Summary:  Rosy is a 30 year old ( : 1990 ) female who initiated a [facility] Visit for COVID-19 (Coronavirus) evaluation and screening. When asked the question \"Please sign me up to receive news, health information and promotions. \", Rosy responded \"No\".    Rosy states her symptoms started suddenly 3-4 days ago. After her symptoms started, they improved and then got worse again.   Her symptoms consist of facial pain or pressure, myalgia, chills, malaise, diarrhea, a headache, and nausea. She is experiencing mild difficulty breathing with activities but can speak normally in full sentences. Rosy also feels feverish but was unable to measure her temperature.   Symptom details     Facial pain or pressure: The facial pain or pressure does not feel worse when bending or leaning forward.     Headache: She states the headache is moderate (4-6 on a 10 point pain scale).      Rosy denies having vomiting, rhinitis, sore throat, teeth pain, ageusia, ear pain, wheezing, cough, nasal congestion, and anosmia. She also denies taking antibiotic medication in the past month, having recent facial or sinus surgery in the past 60 days, and having a sinus infection within the past year.   Precipitating events  She has not recently been exposed to someone with influenza. Rosy has not been in close contact with any high risk individuals.   Pertinent COVID-19 (Coronavirus) information  Rosy does not work or volunteer as healthcare worker or a . In the past 14 days, Rosy has not worked or volunteered at a healthcare facility or group living setting.   In the past 14 days, she also has not lived in a congregate living setting.   Rosy has not had a close contact " with a laboratory-confirmed COVID-19 patient within 14 days of symptom onset.    Since December 2019, Rosy has not been tested for COVID-19 and has had upper respiratory infection (URI) or influenza-like illness.      Date(s) of previous URI or influenza-like illness (free-text): The week leading up to Summer     Symptoms Rosy experienced during previous URI or influenza-like illness as reported by the patient (free-text): Cough, Headache, Muscle Aches        Pertinent medical history  Rosy does not get yeast infections when she takes antibiotics.   Rosy does not need a return to work/school note.   Weight: 205 lbs   Rosy does not smoke or use smokeless tobacco.   She denies pregnancy and denies breastfeeding. She does not menstruate.   Weight: 205 lbs    MEDICATIONS: bupropion HCl oral, ALLERGIES: bupropion  Clinician Response:  Dear Rosy,   Your symptoms show that you may have coronavirus (COVID-19). This illness can cause fever, cough and trouble breathing. Many people get a mild case and get better on their own. Some people can get very sick.  What should I do?  We would like to test you for this virus.   1. Please call [phone] to schedule your visit. Explain that you were referred by [facility] to have a COVID-19 test. Be ready to share your [facility] visit ID number.  * If you need to schedule in [facility] please call [phone] or for [employer] employees please call [phone].  * If you need to schedule in the [place] area please call [phone]. [place] employees call [phone].  The following will serve as your written order for this COVID Test, ordered by me, for the indication of suspected COVID [Z20.828]: The test will be ordered in [employer], our electronic health record, after you are scheduled. It will show as ordered and authorized by Laron Husain MD.  Order: COVID-19 (Coronavirus) PCR for SYMPTOMATIC testing from [facility].   2. When it's time for your COVID test:  Stay at least 6 feet  "away from others. (If someone will drive you to your test, stay in the backseat, as far away from the  as you can.)   Cover your mouth and nose with a mask, tissue or washcloth.  Go straight to the testing site. Don't make any stops on the way there or back.      3.Starting now: Stay home and away from others (self-isolate) until:   You've had no fever---and no medicine that reduces fever---for one full day (24 hours). And...   Your other symptoms have gotten better. For example, your cough or breathing has improved. And...   At least 10 days have passed since your symptoms started.       During this time, don't leave the house except for testing or medical care.   Stay in your own room, even for meals. Use your own bathroom if you can.   Stay away from others in your home. No hugging, kissing or shaking hands. No visitors.  Don't go to work, school or anywhere else.    Clean \"high touch\" surfaces often (doorknobs, counters, handles, etc.). Use a household cleaning spray or wipes. You'll find a full list of  on the EPA website: www.epa.gov/pesticide-registration/list-n-disinfectants-use-against-sars-cov-2.   Cover your mouth and nose with a mask, tissue or washcloth to avoid spreading germs.  Wash your hands and face often. Use soap and water.  Caregivers in these groups are at risk for severe illness due to COVID-19:  o People 65 years and older  o People who live in a nursing home or long-term care facility  o People with chronic disease (lung, heart, cancer, diabetes, kidney, liver, immunologic)  o People who have a weakened immune system, including those who:   Are in cancer treatment  Take medicine that weakens the immune system, such as corticosteroids  Had a bone marrow or organ transplant  Have an immune deficiency  Have poorly controlled HIV or AIDS  Are obese (body mass index of 40 or higher)  Smoke regularly   o Caregivers should wear gloves while washing dishes, handling laundry and " cleaning bedrooms and bathrooms.  o Use caution when washing and drying laundry: Don't shake dirty laundry, and use the warmest water setting that you can.  o For more tips, go to www.cdc.gov/coronavirus/2019-ncov/downloads/10Things.pdf.    4.Sign up for [employer]. We know it's scary to hear that you might have COVID-19. We want to track your symptoms to make sure you're okay over the next 2 weeks. Please look for an email from [employer]---this is a free, online program that we'll use to keep in touch. To sign up, follow the link in the email. Learn more at [email or web address]  How can I take care of myself?   Get lots of rest. Drink extra fluids (unless a doctor has told you not to).   Take Tylenol (acetaminophen) for fever or pain. If you have liver or kidney problems, ask your family doctor if it's okay to take Tylenol.   Adults can take either:    650 mg (two 325 mg pills) every 4 to 6 hours, or...   1,000 mg (two 500 mg pills) every 8 hours as needed.    Note: Don't take more than 3,000 mg in one day. Acetaminophen is found in many medicines (both prescribed and over-the-counter medicines). Read all labels to be sure you don't take too much.   For children, check the Tylenol bottle for the right dose. The dose is based on the child's age or weight.    If you have other health problems (like cancer, heart failure, an organ transplant or severe kidney disease): Call your specialty clinic if you don't feel better in the next 2 days.       Know when to call 911. Emergency warning signs include:    Trouble breathing or shortness of breath Pain or pressure in the chest that doesn't go away Feeling confused like you haven't felt before, or not being able to wake up Bluish-colored lips or face.  Where can I get more information?    [employer] -- About COVID-19: [email or web address]   CDC -- What to Do If You're Sick: www.cdc.gov/coronavirus/2019-ncov/about/steps-when-sick.html    CDC -- Ending Home Isolation: www.cdc.gov/coronavirus/2019-ncov/hcp/disposition-in-home-patients.html   CDC -- Caring for Someone: www.cdc.gov/coronavirus/2019-ncov/if-you-are-sick/care-for-someone.html   [facility] -- Interim Guidance for Hospital Discharge to Home: www.health.[facility].[place]./diseases/coronavirus/hcp/hospdischarge.pdf   [facility] clinical trials (COVID-19 research studies): clinicalaffairs.[facility]-clinical-trials    Below are the COVID-19 hotlines at the [place] Department of Health ([facility]). Interpreters are available.    For health questions: Call 372-849-3786 or 1-437.356.7871 (7 a.m. to 7 p.m.) For questions about schools and childcare: Call 176-933-7908 or 1-408.445.7466 (7 a.m. to 7 p.m.)    Diagnosis: Contact with and (suspected) exposure to other communicable diseases  Diagnosis ICD: Z20.89

## 2020-12-06 ENCOUNTER — HEALTH MAINTENANCE LETTER (OUTPATIENT)
Age: 30
End: 2020-12-06

## 2021-09-25 ENCOUNTER — HEALTH MAINTENANCE LETTER (OUTPATIENT)
Age: 31
End: 2021-09-25

## 2021-12-13 ENCOUNTER — APPOINTMENT (OUTPATIENT)
Dept: ULTRASOUND IMAGING | Facility: CLINIC | Age: 31
End: 2021-12-13
Attending: FAMILY MEDICINE
Payer: COMMERCIAL

## 2021-12-13 ENCOUNTER — HOSPITAL ENCOUNTER (EMERGENCY)
Facility: CLINIC | Age: 31
Discharge: HOME | End: 2021-12-14
Attending: FAMILY MEDICINE | Admitting: FAMILY MEDICINE
Payer: COMMERCIAL

## 2021-12-13 ENCOUNTER — APPOINTMENT (OUTPATIENT)
Dept: CT IMAGING | Facility: CLINIC | Age: 31
End: 2021-12-13
Attending: FAMILY MEDICINE
Payer: COMMERCIAL

## 2021-12-13 VITALS
RESPIRATION RATE: 8 BRPM | HEART RATE: 76 BPM | BODY MASS INDEX: 34.04 KG/M2 | SYSTOLIC BLOOD PRESSURE: 106 MMHG | OXYGEN SATURATION: 99 % | TEMPERATURE: 99.3 F | HEIGHT: 62 IN | WEIGHT: 185 LBS | DIASTOLIC BLOOD PRESSURE: 57 MMHG

## 2021-12-13 DIAGNOSIS — R10.84 ABDOMINAL PAIN, GENERALIZED: ICD-10-CM

## 2021-12-13 LAB
ALBUMIN SERPL-MCNC: 3.9 G/DL (ref 3.4–5)
ALBUMIN UR-MCNC: NEGATIVE MG/DL
ALP SERPL-CCNC: 46 U/L (ref 40–150)
ALT SERPL W P-5'-P-CCNC: 26 U/L (ref 0–50)
ANION GAP SERPL CALCULATED.3IONS-SCNC: 7 MMOL/L (ref 3–14)
APPEARANCE UR: CLEAR
AST SERPL W P-5'-P-CCNC: 18 U/L (ref 0–45)
BASOPHILS # BLD AUTO: 0.1 10E3/UL (ref 0–0.2)
BASOPHILS NFR BLD AUTO: 1 %
BILIRUB SERPL-MCNC: 0.8 MG/DL (ref 0.2–1.3)
BILIRUB UR QL STRIP: NEGATIVE
BUN SERPL-MCNC: 8 MG/DL (ref 7–30)
CALCIUM SERPL-MCNC: 9.3 MG/DL (ref 8.5–10.1)
CHLORIDE BLD-SCNC: 111 MMOL/L (ref 94–109)
CO2 SERPL-SCNC: 25 MMOL/L (ref 20–32)
COLOR UR AUTO: ABNORMAL
CREAT SERPL-MCNC: 0.69 MG/DL (ref 0.52–1.04)
EOSINOPHIL # BLD AUTO: 0.2 10E3/UL (ref 0–0.7)
EOSINOPHIL NFR BLD AUTO: 2 %
ERYTHROCYTE [DISTWIDTH] IN BLOOD BY AUTOMATED COUNT: 12.5 % (ref 10–15)
GFR SERPL CREATININE-BSD FRML MDRD: >90 ML/MIN/1.73M2
GLUCOSE BLD-MCNC: 82 MG/DL (ref 70–99)
GLUCOSE UR STRIP-MCNC: NEGATIVE MG/DL
HCG SERPL QL: NEGATIVE
HCT VFR BLD AUTO: 44.5 % (ref 35–47)
HGB BLD-MCNC: 15.4 G/DL (ref 11.7–15.7)
HGB UR QL STRIP: NEGATIVE
HOLD SPECIMEN: NORMAL
HOLD SPECIMEN: NORMAL
IMM GRANULOCYTES # BLD: 0 10E3/UL
IMM GRANULOCYTES NFR BLD: 0 %
KETONES UR STRIP-MCNC: 20 MG/DL
LEUKOCYTE ESTERASE UR QL STRIP: NEGATIVE
LIPASE SERPL-CCNC: 103 U/L (ref 73–393)
LYMPHOCYTES # BLD AUTO: 4.1 10E3/UL (ref 0.8–5.3)
LYMPHOCYTES NFR BLD AUTO: 48 %
MCH RBC QN AUTO: 30.6 PG (ref 26.5–33)
MCHC RBC AUTO-ENTMCNC: 34.6 G/DL (ref 31.5–36.5)
MCV RBC AUTO: 89 FL (ref 78–100)
MONOCYTES # BLD AUTO: 0.8 10E3/UL (ref 0–1.3)
MONOCYTES NFR BLD AUTO: 9 %
NEUTROPHILS # BLD AUTO: 3.4 10E3/UL (ref 1.6–8.3)
NEUTROPHILS NFR BLD AUTO: 40 %
NITRATE UR QL: NEGATIVE
NRBC # BLD AUTO: 0 10E3/UL
NRBC BLD AUTO-RTO: 0 /100
PH UR STRIP: 6 [PH] (ref 5–7)
PLATELET # BLD AUTO: 311 10E3/UL (ref 150–450)
POTASSIUM BLD-SCNC: 3.7 MMOL/L (ref 3.4–5.3)
PROT SERPL-MCNC: 7.9 G/DL (ref 6.8–8.8)
RBC # BLD AUTO: 5.03 10E6/UL (ref 3.8–5.2)
SODIUM SERPL-SCNC: 143 MMOL/L (ref 133–144)
SP GR UR STRIP: 1 (ref 1–1.03)
UROBILINOGEN UR STRIP-MCNC: NORMAL MG/DL
WBC # BLD AUTO: 8.6 10E3/UL (ref 4–11)

## 2021-12-13 PROCEDURE — 96376 TX/PRO/DX INJ SAME DRUG ADON: CPT | Performed by: FAMILY MEDICINE

## 2021-12-13 PROCEDURE — 96375 TX/PRO/DX INJ NEW DRUG ADDON: CPT | Performed by: FAMILY MEDICINE

## 2021-12-13 PROCEDURE — 80053 COMPREHEN METABOLIC PANEL: CPT | Performed by: FAMILY MEDICINE

## 2021-12-13 PROCEDURE — 96374 THER/PROPH/DIAG INJ IV PUSH: CPT | Mod: 59 | Performed by: FAMILY MEDICINE

## 2021-12-13 PROCEDURE — 85025 COMPLETE CBC W/AUTO DIFF WBC: CPT | Performed by: FAMILY MEDICINE

## 2021-12-13 PROCEDURE — 250N000011 HC RX IP 250 OP 636: Performed by: FAMILY MEDICINE

## 2021-12-13 PROCEDURE — 99285 EMERGENCY DEPT VISIT HI MDM: CPT | Mod: 25 | Performed by: FAMILY MEDICINE

## 2021-12-13 PROCEDURE — 83690 ASSAY OF LIPASE: CPT | Performed by: FAMILY MEDICINE

## 2021-12-13 PROCEDURE — 99285 EMERGENCY DEPT VISIT HI MDM: CPT | Performed by: FAMILY MEDICINE

## 2021-12-13 PROCEDURE — 81003 URINALYSIS AUTO W/O SCOPE: CPT | Performed by: FAMILY MEDICINE

## 2021-12-13 PROCEDURE — 76830 TRANSVAGINAL US NON-OB: CPT

## 2021-12-13 PROCEDURE — 84703 CHORIONIC GONADOTROPIN ASSAY: CPT | Performed by: FAMILY MEDICINE

## 2021-12-13 PROCEDURE — 74177 CT ABD & PELVIS W/CONTRAST: CPT

## 2021-12-13 PROCEDURE — C9113 INJ PANTOPRAZOLE SODIUM, VIA: HCPCS | Performed by: FAMILY MEDICINE

## 2021-12-13 PROCEDURE — 36415 COLL VENOUS BLD VENIPUNCTURE: CPT | Performed by: FAMILY MEDICINE

## 2021-12-13 PROCEDURE — 250N000009 HC RX 250: Performed by: FAMILY MEDICINE

## 2021-12-13 PROCEDURE — 76705 ECHO EXAM OF ABDOMEN: CPT | Mod: XS

## 2021-12-13 RX ORDER — ONDANSETRON 2 MG/ML
4 INJECTION INTRAMUSCULAR; INTRAVENOUS ONCE
Status: COMPLETED | OUTPATIENT
Start: 2021-12-13 | End: 2021-12-13

## 2021-12-13 RX ORDER — ONDANSETRON 2 MG/ML
4 INJECTION INTRAMUSCULAR; INTRAVENOUS EVERY 30 MIN PRN
Status: DISCONTINUED | OUTPATIENT
Start: 2021-12-13 | End: 2021-12-14 | Stop reason: HOSPADM

## 2021-12-13 RX ORDER — IOPAMIDOL 755 MG/ML
90 INJECTION, SOLUTION INTRAVASCULAR ONCE
Status: COMPLETED | OUTPATIENT
Start: 2021-12-13 | End: 2021-12-13

## 2021-12-13 RX ORDER — KETOROLAC TROMETHAMINE 15 MG/ML
15 INJECTION, SOLUTION INTRAMUSCULAR; INTRAVENOUS ONCE
Status: COMPLETED | OUTPATIENT
Start: 2021-12-13 | End: 2021-12-13

## 2021-12-13 RX ORDER — HYDROMORPHONE HYDROCHLORIDE 1 MG/ML
0.5 INJECTION, SOLUTION INTRAMUSCULAR; INTRAVENOUS; SUBCUTANEOUS
Status: COMPLETED | OUTPATIENT
Start: 2021-12-13 | End: 2021-12-13

## 2021-12-13 RX ADMIN — ONDANSETRON 4 MG: 2 INJECTION INTRAMUSCULAR; INTRAVENOUS at 18:33

## 2021-12-13 RX ADMIN — IOPAMIDOL 90 ML: 755 INJECTION, SOLUTION INTRAVENOUS at 19:20

## 2021-12-13 RX ADMIN — SODIUM CHLORIDE 62 ML: 9 INJECTION, SOLUTION INTRAVENOUS at 19:21

## 2021-12-13 RX ADMIN — HYDROMORPHONE HYDROCHLORIDE 0.5 MG: 1 INJECTION, SOLUTION INTRAMUSCULAR; INTRAVENOUS; SUBCUTANEOUS at 21:05

## 2021-12-13 RX ADMIN — PANTOPRAZOLE SODIUM 40 MG: 40 INJECTION, POWDER, FOR SOLUTION INTRAVENOUS at 21:52

## 2021-12-13 RX ADMIN — KETOROLAC TROMETHAMINE 15 MG: 15 INJECTION, SOLUTION INTRAMUSCULAR; INTRAVENOUS at 18:32

## 2021-12-13 RX ADMIN — ONDANSETRON 4 MG: 2 INJECTION INTRAMUSCULAR; INTRAVENOUS at 21:05

## 2021-12-13 ASSESSMENT — MIFFLIN-ST. JEOR: SCORE: 1502.53

## 2021-12-14 NOTE — DISCHARGE INSTRUCTIONS
Take omeprazole 20 mg once daily.  Follow-up as planned tomorrow with your primary care physician to discuss whether you should have an upper GI endoscopy or other further testing.  Return to be seen if you develop a fever greater than 100.4, vomiting, inability to take fluids, worsening pain, or other worrisome symptoms.

## 2021-12-14 NOTE — ED TRIAGE NOTES
started at 1400 today, dull mid abdomen, sharp RLQ. No tylenol or ibuprofen today. Diaphoretic in triage, tachy, and unable to obtain a BP.

## 2021-12-14 NOTE — ED PROVIDER NOTES
History     Chief Complaint   Patient presents with     Abdominal Pain     started at 1400 today, dull mid abdomen, sharp RLQ.      HPI    Rosy España is a 31 year old female who comes in with her boyfriend with abdominal pain that began at 2 PM.  Initially in the epigastrium and then becoming more focal in the right lower quadrant.  She feels nauseated but has not vomited.  She has not been febrile.  Yesterday she had an episode of dysuria but not today.  She has some flank pain with this.  She has poor bowel function and has a bowel movement about once a week.  She has had UTIs in the past but not recently.  She takes sertraline for depression and iron supplements.  She had her IUD out in February.  Her LMP was November 28.  Her menses have been irregular since the IUD was removed.  Her boyfriend has had a vasectomy.  She has not had any cough or cold symptoms.  She is intolerant of Vicodin and Percocet which cause nausea and vomiting.    Allergies:  Allergies   Allergen Reactions     Percocet [Oxycodone-Acetaminophen] Dizziness, Headache, Nausea and Vomiting and Visual Disturbance     Vicodin [Hydrocodone-Acetaminophen] Dizziness, Headache, Nausea and Vomiting and Visual Disturbance       Problem List:    Patient Active Problem List    Diagnosis Date Noted     Obsessive thinking 03/30/2013     Priority: Medium     CARDIOVASCULAR SCREENING; LDL GOAL LESS THAN 160 08/16/2011     Priority: Medium     Anxiety 01/06/2011     Priority: Medium        Past Medical History:    No past medical history on file.    Past Surgical History:    Past Surgical History:   Procedure Laterality Date     ARTHROSCOPY KNEE RT/LT  7/07    partial medial meniscectomy, RT       Family History:    Family History   Problem Relation Age of Onset     Heart Disease Father         MI     C.AEVE Father      Diabetes Maternal Grandfather      Breast Cancer No family hx of      Cancer - colorectal No family hx of        Social  "History:  Marital Status:   [2]  Social History     Tobacco Use     Smoking status: Never Smoker     Smokeless tobacco: Never Used   Substance Use Topics     Alcohol use: Yes     Comment: once per month     Drug use: No        Medications:    Ferrous Sulfate Dried (HIGH POTENCY IRON) 65 MG TABS  sertraline (ZOLOFT) 50 MG tablet      Review of Systems  All other systems are reviewed and are negative;    Physical Exam   BP: (!) 85/43  Pulse: (!) 123  Temp: 99.3  F (37.4  C)  Resp: 16  Height: 157.5 cm (5' 2.01\")  Weight: 83.9 kg (185 lb)  SpO2: 99 %      Physical Exam  Nursing note and vitals were reviewed.  Constitutional: Awake and alert, adequately nourished and developed appearing 31-year-old in moderate to severe discomfort, who answers questions with difficulty due to discomfort and cooperates with examination.  HEENT: EOMI.   Neck: Freely mobile.  Cardiovascular: Cardiac examination reveals normal heart rate and regular rhythm without murmur.  Pulmonary/Chest: Breathing is unlabored.  Breath sounds are clear and equal bilaterally.  There no retractions, tachypnea, rales, wheezes, or rhonchi.  Abdomen: Soft, tender in the lower quadrants bilaterally with some referred tenderness to the right lower quadrant.  Tender in the epigastrium.  Some guarding is present but no rebound is present.  No HSM or masses.  Musculoskeletal: Extremities are warm and well-perfused  Neurological: Alert, oriented, thought content logical, coherent   Psychiatric: Affect congruent with acute pain.    ED Course                 Procedures              Critical Care time:  none               Results for orders placed or performed during the hospital encounter of 12/13/21 (from the past 24 hour(s))   CBC with platelets, differential    Narrative    The following orders were created for panel order CBC with platelets, differential.  Procedure                               Abnormality         Status                     ---------       "                         -----------         ------                     CBC with platelets and d...[[id number]]                      Final result                 Please view results for these tests on the individual orders.   Comprehensive metabolic panel   Result Value Ref Range    Sodium 143 133 - 144 mmol/L    Potassium 3.7 3.4 - 5.3 mmol/L    Chloride 111 (H) 94 - 109 mmol/L    Carbon Dioxide (CO2) 25 20 - 32 mmol/L    Anion Gap 7 3 - 14 mmol/L    Urea Nitrogen 8 7 - 30 mg/dL    Creatinine 0.69 0.52 - 1.04 mg/dL    Calcium 9.3 8.5 - 10.1 mg/dL    Glucose 82 70 - 99 mg/dL    Alkaline Phosphatase 46 40 - 150 U/L    AST 18 0 - 45 U/L    ALT 26 0 - 50 U/L    Protein Total 7.9 6.8 - 8.8 g/dL    Albumin 3.9 3.4 - 5.0 g/dL    Bilirubin Total 0.8 0.2 - 1.3 mg/dL    GFR Estimate >90 >60 mL/min/1.73m2   Wilder Draw    Narrative    The following orders were created for panel order Wilder Draw.  Procedure                               Abnormality         Status                     ---------                               -----------         ------                     Extra Blue Top Tube[[id number]]                              Final result               Extra Red Top Tube[[id number]]                               Final result                 Please view results for these tests on the individual orders.   CBC with platelets and differential   Result Value Ref Range    WBC Count 8.6 4.0 - 11.0 10e3/uL    RBC Count 5.03 3.80 - 5.20 10e6/uL    Hemoglobin 15.4 11.7 - 15.7 g/dL    Hematocrit 44.5 35.0 - 47.0 %    MCV 89 78 - 100 fL    MCH 30.6 26.5 - 33.0 pg    MCHC 34.6 31.5 - 36.5 g/dL    RDW 12.5 10.0 - 15.0 %    Platelet Count 311 150 - 450 10e3/uL    % Neutrophils 40 %    % Lymphocytes 48 %    % Monocytes 9 %    % Eosinophils 2 %    % Basophils 1 %    % Immature Granulocytes 0 %    NRBCs per 100 WBC 0 <1 /100    Absolute Neutrophils 3.4 1.6 - 8.3 10e3/uL    Absolute Lymphocytes 4.1 0.8 - 5.3 10e3/uL    Absolute Monocytes 0.8  0.0 - 1.3 10e3/uL    Absolute Eosinophils 0.2 0.0 - 0.7 10e3/uL    Absolute Basophils 0.1 0.0 - 0.2 10e3/uL    Absolute Immature Granulocytes 0.0 <=0.4 10e3/uL    Absolute NRBCs 0.0 10e3/uL   Extra Blue Top Tube   Result Value Ref Range    Hold Specimen JIC    Extra Red Top Tube   Result Value Ref Range    Hold Specimen JIC    Lipase   Result Value Ref Range    Lipase 103 73 - 393 U/L   HCG qualitative Blood   Result Value Ref Range    hCG Serum Qualitative Negative Negative   UA reflex to Microscopic   Result Value Ref Range    Color Urine Straw Colorless, Straw, Light Yellow, Yellow    Appearance Urine Clear Clear    Glucose Urine Negative Negative mg/dL    Bilirubin Urine Negative Negative    Ketones Urine 20  (A) Negative mg/dL    Specific Gravity Urine 1.002 (L) 1.003 - 1.035    Blood Urine Negative Negative    pH Urine 6.0 5.0 - 7.0    Protein Albumin Urine Negative Negative mg/dL    Urobilinogen Urine Normal Normal, 2.0 mg/dL    Nitrite Urine Negative Negative    Leukocyte Esterase Urine Negative Negative    Narrative    Microscopic not indicated   CT Abdomen Pelvis w Contrast    Narrative    EXAM: CT ABDOMEN PELVIS W CONTRAST  LOCATION: [facility]  DATE/TIME: 12/13/2021 7:11 PM    INDICATION: RlQ abd pain  COMPARISON: None.  TECHNIQUE: CT scan of the abdomen and pelvis was performed following injection of IV contrast. Multiplanar reformats were obtained. Dose reduction techniques were used.  CONTRAST: 90    FINDINGS:   LOWER CHEST: Normal.    HEPATOBILIARY: Normal.    PANCREAS: Normal.    SPLEEN: Normal.    ADRENAL GLANDS: Normal.    KIDNEYS/BLADDER: No renal calculi or hydronephrosis.    BOWEL: Tiny portion the appendix is visualized and is unremarkable. A few slightly distended fluid-filled loops of small bowel. Otherwise no evidence for obstruction. Moderate stool in the colon.    LYMPH NODES: Normal.    VASCULATURE: Unremarkable.    PELVIC ORGANS: Small peripherally hyperdense  follicles both ovaries. Trace free pelvic fluid.    MUSCULOSKELETAL: Advanced degenerative disc disease L5 level with low-grade anterior spondylolisthesis of L5 on the sacrum.      Impression    IMPRESSION:   1.  No evidence for appendicitis.  2.  Small follicles both ovaries presumed physiologic.  3.  Trace free pelvic fluid.  4.  No other findings to account for the patient's right lower quadrant pain.   US Pelvis Cmplt w Transvag & Doppler LmtPel Duplex Limited    Narrative    EXAM: US PELVIS COMPLETE W TRANSVAGINAL AND DOPPLER LIMITED  LOCATION: [facility]  DATE/TIME: 12/13/2021 7:41 PM    INDICATION: Pelvic pain, R>L.  COMPARISON: CT 12/13/2021.  TECHNIQUE: Transabdominal scans were performed. Endovaginal ultrasound was performed to better visualize the adnexa. Color flow with spectral Doppler and waveform analysis performed.    FINDINGS:    UTERUS: 8 x 4 x 4 cm. Normal in size and position with no masses.    ENDOMETRIUM: 13 mm. Normal smooth endometrium.    RIGHT OVARY: 3 x 2.4 x 2.8 cm. Normal with arterial and venous duplex flow identified.    LEFT OVARY: 2.7 x 2.2 x 2.3 cm. Normal with arterial and venous duplex flow identified.    No significant free fluid.      Impression    IMPRESSION:    1.  Normal pelvic ultrasound.         US Abdomen Limited (RUQ)    Narrative    EXAM: US ABDOMEN LIMITED  LOCATION: [facility]  DATE/TIME: 12/13/2021 10:24 PM    INDICATION: RUQ abd pain  COMPARISON: None.  TECHNIQUE: Limited abdominal ultrasound.    FINDINGS:    GALLBLADDER: Normal. No gallstones, wall thickening, or pericholecystic fluid. Negative sonographic Berger's sign.    BILE DUCTS: No biliary dilatation. The common duct measures 3 mm.    LIVER: Normal parenchyma with smooth contour. No focal mass.    RIGHT KIDNEY: No hydronephrosis.    PANCREAS: The visualized portions are normal.    No ascites.      Impression    IMPRESSION:  1.  Normal right upper  quadrant. No gallstone or biliary dilatation.           Medications   ondansetron (ZOFRAN) injection 4 mg (4 mg Intravenous Given 12/13/21 2105)   ketorolac (TORADOL) injection 15 mg (15 mg Intravenous Given 12/13/21 1832)   ondansetron (ZOFRAN) injection 4 mg (4 mg Intravenous Given 12/13/21 1833)   iopamidol (ISOVUE-370) solution 90 mL (90 mLs Intravenous Given 12/13/21 1920)   sodium chloride 0.9 % bag 500mL for CT scan flush use (62 mLs Intravenous Given 12/13/21 1921)   HYDROmorphone (PF) (DILAUDID) injection 0.5 mg (0.5 mg Intravenous Given 12/13/21 2105)   pantoprazole (PROTONIX) IV push injection 40 mg (40 mg Intravenous Given 12/13/21 2152)     21:15: Patient received 0.5 mg Dilaudid IV for pain. She became diaphoretic nauseated and bradycardic and briefly lost consciousness. On the monitor she was in a sinus bradycardia. She was hypotensive. She recovered without the need for Narcan. We will start some IV fluids and monitor her.    21:45: Patient reassessed.  She is now feeling better from the hypotensive episode.  She continues to have abdominal pain.  She localizes it now more to the epigastrium.  I reexamined her abdomen.  She is tender in the epigastrium but without rebound or guarding.  I recommended we check a right upper quadrant ultrasound to look for evidence of gallbladder disease not seen on CT or by laboratory evaluation..    Assessments & Plan (with Medical Decision Making)     31-year-old female presented with relatively abrupt onset of generalized abdominal pain migrating from the epigastrium to the right lower quadrant without a clear focal tender spot on examination.  Her laboratory studies were all normal.  Urine analysis was normal.  CT scan of the abdomen and pelvis was normal.  Pelvic ultrasound was normal.  Right upper quadrant ultrasound was normal.  We did not find evidence of acute appendicitis or other inflammatory process in the abdomen.  No evidence of gallbladder disease on  right upper quadrant ultrasound.  No evidence of ovarian cyst or torsion on pelvic ultrasound.  The cause of her symptoms is uncertain.  She reacted poorly to Dilaudid and had a vagal reaction but recovered promptly.  She is reacted poorly to Percocet and Vicodin in the past.  She will stick with acetaminophen at home.  She could have acid peptic disease and gastritis or ulcer.  We gave her a dose of Protonix in the ED.  She should continue on omeprazole.  She has scheduled follow-up with her primary physician tomorrow and will discuss whether she should have further work-up such as upper GI endoscopy but today's evaluation is reassuring and she is safe for discharge home.  I discussed the findings with her and her partner and they expressed understanding.    I have reviewed the nursing notes.    I have reviewed the findings, diagnosis, plan and need for follow up with the patient.       New Prescriptions    No medications on file       Final diagnoses:   Abdominal pain, generalized       12/13/2021   [facility] EMERGENCY DEPT     Miguel Rosenthal MD  12/13/21 1650

## 2022-01-15 ENCOUNTER — HEALTH MAINTENANCE LETTER (OUTPATIENT)
Age: 32
End: 2022-01-15

## 2023-01-07 ENCOUNTER — HEALTH MAINTENANCE LETTER (OUTPATIENT)
Age: 33
End: 2023-01-07

## 2023-04-22 ENCOUNTER — HEALTH MAINTENANCE LETTER (OUTPATIENT)
Age: 33
End: 2023-04-22

## 2024-06-29 ENCOUNTER — HEALTH MAINTENANCE LETTER (OUTPATIENT)
Age: 34
End: 2024-06-29

## 2024-12-20 ENCOUNTER — OFFICE VISIT (OUTPATIENT)
Dept: URGENT CARE | Facility: URGENT CARE | Age: 34
End: 2024-12-20
Payer: COMMERCIAL

## 2024-12-20 VITALS
HEART RATE: 89 BPM | RESPIRATION RATE: 20 BRPM | DIASTOLIC BLOOD PRESSURE: 76 MMHG | TEMPERATURE: 97.4 F | SYSTOLIC BLOOD PRESSURE: 135 MMHG | BODY MASS INDEX: 27.43 KG/M2 | OXYGEN SATURATION: 100 % | WEIGHT: 150 LBS

## 2024-12-20 DIAGNOSIS — J10.1 INFLUENZA A: Primary | ICD-10-CM

## 2024-12-20 DIAGNOSIS — R50.9 FEVER IN ADULT: ICD-10-CM

## 2024-12-20 LAB
DEPRECATED S PYO AG THROAT QL EIA: NEGATIVE
FLUAV AG SPEC QL IA: POSITIVE
FLUBV AG SPEC QL IA: NEGATIVE
S PYO DNA THROAT QL NAA+PROBE: NOT DETECTED

## 2024-12-20 PROCEDURE — 99203 OFFICE O/P NEW LOW 30 MIN: CPT | Performed by: PHYSICIAN ASSISTANT

## 2024-12-20 PROCEDURE — 87804 INFLUENZA ASSAY W/OPTIC: CPT | Performed by: PHYSICIAN ASSISTANT

## 2024-12-20 PROCEDURE — 87651 STREP A DNA AMP PROBE: CPT | Performed by: PHYSICIAN ASSISTANT

## 2024-12-20 RX ORDER — HYDROXYZINE HYDROCHLORIDE 10 MG/1
10 TABLET, FILM COATED ORAL
COMMUNITY
Start: 2023-08-21

## 2024-12-20 NOTE — PROGRESS NOTES
"  Assessment & Plan     Influenza A  This is a viral illness. Continue with supportive care. Can take tylenol and/or ibuprofen as needed for pain and fever control.  Get plenty of rest and push fluids. Wash hands frequently and avoid sharing food/beverage. Should be fever free for 24 hours before returning to work or school.         Fever in adult    - Streptococcus A Rapid Screen w/Reflex to PCR - Clinic Collect  - Group A Streptococcus PCR Throat Swab  - Influenza A & B Antigen - Clinic Collect              Return in about 1 week (around 12/27/2024), or if symptoms worsen or fail to improve.        Subjective   Chief Complaint   Patient presents with    chest congestion     2 days ago pt fell her \"chest hurt and was burning\", then she got a fever, and bad headache and body aches.  Pt took ibuprofen this morning.       HPI     URI Adult    Onset of symptoms was 2 day(s) ago.  Course of illness is same.    Severity moderate  Current and Associated symptoms: fever, body aches, congestion   Treatment measures tried include Tylenol/Ibuprofen.  Predisposing factors include None.                      Objective    /76   Pulse 89   Temp 97.4  F (36.3  C) (Oral)   Resp 20   Wt 68 kg (150 lb)   SpO2 100%   BMI 27.43 kg/m    Body mass index is 27.43 kg/m .      Physical Exam  Constitutional:       Appearance: She is well-developed.   HENT:      Head: Normocephalic.      Right Ear: Tympanic membrane and ear canal normal.      Left Ear: Tympanic membrane and ear canal normal.   Eyes:      Conjunctiva/sclera: Conjunctivae normal.   Cardiovascular:      Rate and Rhythm: Normal rate.      Heart sounds: Normal heart sounds.   Pulmonary:      Effort: Pulmonary effort is normal.      Breath sounds: Normal breath sounds.   Skin:     General: Skin is warm and dry.      Findings: No rash.   Psychiatric:         Behavior: Behavior normal.                    Signed Electronically by: Kassidy Hodges PA-C    "

## 2024-12-20 NOTE — LETTER
December 20, 2024      Rosy España  [id number]  [place] [address]        To Whom It May Concern:    Rosy España  was seen and treated in clinic. Please excuse from work 12/20/24.          Sincerely,          Kassidy Hodges PA-C

## 2025-07-12 ENCOUNTER — HEALTH MAINTENANCE LETTER (OUTPATIENT)
Age: 35
End: 2025-07-12